# Patient Record
Sex: MALE | Race: WHITE
[De-identification: names, ages, dates, MRNs, and addresses within clinical notes are randomized per-mention and may not be internally consistent; named-entity substitution may affect disease eponyms.]

---

## 2019-10-15 LAB
ANION GAP SERPL CALC-SCNC: 9 MMOL/L (ref 5–19)
BUN SERPL-MCNC: 22 MG/DL (ref 7–20)
CALCIUM: 9.1 MG/DL (ref 8.4–10.2)
CHLORIDE SERPL-SCNC: 104 MMOL/L (ref 98–107)
CO2 SERPL-SCNC: 26 MMOL/L (ref 22–30)
GLUCOSE SERPL-MCNC: 117 MG/DL (ref 75–110)
POTASSIUM SERPL-SCNC: 4.3 MMOL/L (ref 3.6–5)

## 2019-10-15 NOTE — EKG REPORT
SEVERITY:- ABNORMAL ECG -

SINUS RHYTHM

RIGHT BUNDLE BRANCH BLOCK

:

Confirmed by: Vaishali Bhatia 15-Oct-2019 21:40:22

## 2019-10-17 ENCOUNTER — HOSPITAL ENCOUNTER (OUTPATIENT)
Dept: HOSPITAL 62 - OROUT | Age: 69
Discharge: HOME | End: 2019-10-17
Attending: SURGERY
Payer: MEDICARE

## 2019-10-17 DIAGNOSIS — K64.2: Primary | ICD-10-CM

## 2019-10-17 DIAGNOSIS — I10: ICD-10-CM

## 2019-10-17 DIAGNOSIS — E78.00: ICD-10-CM

## 2019-10-17 DIAGNOSIS — I25.10: ICD-10-CM

## 2019-10-17 DIAGNOSIS — Z79.899: ICD-10-CM

## 2019-10-17 DIAGNOSIS — Z87.891: ICD-10-CM

## 2019-10-17 LAB
ERYTHROCYTE [DISTWIDTH] IN BLOOD BY AUTOMATED COUNT: 14.2 % (ref 11.5–14)
HCT VFR BLD CALC: 39.3 % (ref 37.9–51)
HGB BLD-MCNC: 13.3 G/DL (ref 13.5–17)
MCH RBC QN AUTO: 28.6 PG (ref 27–33.4)
MCHC RBC AUTO-ENTMCNC: 33.8 G/DL (ref 32–36)
MCV RBC AUTO: 85 FL (ref 80–97)
PLATELET # BLD: 156 10^3/UL (ref 150–450)
RBC # BLD AUTO: 4.64 10^6/UL (ref 4.35–5.55)
WBC # BLD AUTO: 5.6 10^3/UL (ref 4–10.5)

## 2019-10-17 PROCEDURE — 85027 COMPLETE CBC AUTOMATED: CPT

## 2019-10-17 PROCEDURE — 36415 COLL VENOUS BLD VENIPUNCTURE: CPT

## 2019-10-17 PROCEDURE — 80048 BASIC METABOLIC PNL TOTAL CA: CPT

## 2019-10-17 PROCEDURE — 00902 ANES ANORECTAL PX: CPT

## 2019-10-17 PROCEDURE — 93010 ELECTROCARDIOGRAM REPORT: CPT

## 2019-10-17 PROCEDURE — 93005 ELECTROCARDIOGRAM TRACING: CPT

## 2019-10-17 PROCEDURE — 46221 LIGATION OF HEMORRHOID(S): CPT

## 2019-10-17 NOTE — OPERATIVE REPORT
Nonrecallable Operative Report


DATE OF SURGERY: 10/17/19


PREOPERATIVE DIAGNOSIS: Bleeding grade 3 internal hemorrhoids


POSTOPERATIVE DIAGNOSIS: Bleeding grade 3 internal hemorrhoids


OPERATION: Rubber band ligation of internal hemorrhoids x5.


SURGEON: REGINE JONES


1ST ASSISTANT: SALVATORE CONTRERAS


ANESTHESIA: LMAC


TISSUE REMOVED OR ALTERED: None


COMPLICATIONS: 





None apparent


ESTIMATED BLOOD LOSS: Minimal


PROCEDURE: 





Procedure in detail: After informed consent was obtained, the patient was 

brought to the operating room and laid in the left lateral decubitus position.  

A Hill-Woodward retractor was inserted into the rectum.  Internal hemorrhoids 

were very large in the left lateral and right posterior positions.  Rubber bands

were placed around the internal hemorrhoids.  2 were placed in the left lateral 

position, one was placed in the right anterior position, and 2 were placed in th

e right posterior position.  After all the hemorrhoidal tissue was addressed, 

the Hill-Woodward retractor was removed, and the procedure was concluded.  All 

sponge, instrument, and needle counts were correct x2.





Condition: Stable.

## 2019-10-17 NOTE — DISCHARGE SUMMARY
Discharge Summary (SDC)





- Discharge


Final Diagnosis: 





Bleeding grade 3 internal hemorrhoids


Date of Surgery: 10/17/19


Discharge Date: 10/17/19


Condition: Stable


Treatment or Instructions: 


Discharge home.  Diet as tolerated.  Activity: Nonstrenuous.  Follow-up with me 

in 3 weeks.  Warm sits baths twice daily and after bowel movements.  5% 

lidocaine ointment to rectum 3 times daily.  Ibuprofen 800 mg p.o. 3 times daily

with meals.  Stool softener and fiber supplement twice daily.


Referrals: 


ARIANNE TAPIA MD [Primary Care Provider] - 


Discharge Diet: As Tolerated


Respiratory Treatments at Home: Deep Breathing/Coughing, Incentive Spirometer


Discharge Activity: Activity As Tolerated, Balance Activity w/Rest


Report the Following to Your Physician Immediately: Shortness of Breath, Nausea,

Vomiting, Increase in Pain, Fever over 101 Degrees, Unusual Bleeding